# Patient Record
Sex: MALE | Employment: FULL TIME | URBAN - METROPOLITAN AREA
[De-identification: names, ages, dates, MRNs, and addresses within clinical notes are randomized per-mention and may not be internally consistent; named-entity substitution may affect disease eponyms.]

---

## 2017-01-31 ENCOUNTER — HOSPITAL ENCOUNTER (OUTPATIENT)
Dept: ULTRASOUND IMAGING | Age: 37
Discharge: HOME | End: 2017-01-31
Attending: INTERNAL MEDICINE
Payer: COMMERCIAL

## 2017-01-31 ENCOUNTER — HOSPITAL ENCOUNTER (OUTPATIENT)
Dept: NUCLEAR MEDICINE | Age: 37
Discharge: HOME | End: 2017-01-31
Attending: INTERNAL MEDICINE
Payer: COMMERCIAL

## 2017-01-31 VITALS — WEIGHT: 192 LBS

## 2017-01-31 DIAGNOSIS — R10.13 EPIGASTRIC ABDOMINAL PAIN OF UNKNOWN ETIOLOGY: ICD-10-CM

## 2017-01-31 PROCEDURE — 78227 HEPATOBIL SYST IMAGE W/DRUG: CPT

## 2017-01-31 PROCEDURE — 76700 US EXAM ABDOM COMPLETE: CPT

## 2017-01-31 PROCEDURE — 74011250636 HC RX REV CODE- 250/636

## 2017-01-31 RX ORDER — MORPHINE SULFATE 2 MG/ML
INJECTION, SOLUTION INTRAMUSCULAR; INTRAVENOUS
Status: COMPLETED
Start: 2017-01-31 | End: 2017-01-31

## 2017-01-31 RX ORDER — MORPHINE SULFATE 2 MG/ML
2 INJECTION, SOLUTION INTRAMUSCULAR; INTRAVENOUS ONCE
Status: COMPLETED | OUTPATIENT
Start: 2017-01-31 | End: 2017-01-31

## 2017-01-31 RX ADMIN — MORPHINE SULFATE 2 MG: 2 INJECTION, SOLUTION INTRAMUSCULAR; INTRAVENOUS at 14:17

## 2017-01-31 RX ADMIN — Medication 2 MG: at 14:17

## 2017-02-08 ENCOUNTER — OFFICE VISIT (OUTPATIENT)
Dept: SURGERY | Age: 37
End: 2017-02-08

## 2017-02-08 VITALS
OXYGEN SATURATION: 99 % | DIASTOLIC BLOOD PRESSURE: 82 MMHG | RESPIRATION RATE: 16 BRPM | HEART RATE: 83 BPM | SYSTOLIC BLOOD PRESSURE: 128 MMHG | WEIGHT: 191 LBS

## 2017-02-08 DIAGNOSIS — K80.20 GALLSTONES: Primary | ICD-10-CM

## 2017-02-08 NOTE — PROGRESS NOTES
The patient is a 39 y.o. man referred by Dr. Pushpa Calles regarding gallstones. The patient reports that he had a history over several months of pain in the epigastrium which became at times severe after eating fatty food. Sometimes it would radiate to the back. It was associated with a feeling of intense bloating as though vomiting would help. He did not have any actual vomiting. He has discovered over the last month that if he avoids fatty food he does not have active symptoms. He has tried acid blocking medications to try to control his symptoms. He has no other GI history. He has no history of abdominal surgery. The patient does actively smoke. The patient occasionally uses alcohol. The patient denies a history of heart disease or coronary intervention. He has no history of shortness of breath. There is no history of diabetes. There is no history of thyroid disease. Physical Examination:   GENERAL:  Alert man in no acute distress. VITAL SIGNS:  BP:  120/82. P:  83.  O2 saturation 99% on room air. WT:  191 lb. HEAD/NECK:  No jaundice, mass or thyromegaly. LUNGS:  Clear bilaterally without wheeze, rhonchi or rales. Ayan Broach HEART:  Regular without murmur, gallop or rub. ABDOMEN: Soft, nontender with no mass being palpated. No hernias are noted. NEURO:  Patient is alert and oriented x 3 and moves all extremities equally. Facial movement is symmetrical. Speech was normal.       The patient had an ultrasound which showed contracted gallbladder with multiple stones. The patient had a HIDA scan which showed nonvisualization of the gallbladder. There is no record of liver function tests. The patient has symptoms that do sound consistent with those of biliary origin. I would recommend planning for  laparoscopic cholecystectomy with possible open cholecystectomy and possible cholangiogram.  Typical operative and post operative courses were reviewed.   I reviewed with the patient the risks vs benefits of  laparoscopic cholecystectomy. Risks would include bleeding, infection, injury to abdominal organs, injury to common bile duct, bile leak, failure to resolve symptoms, potential need for open surgery, risk of general anesthesia, etc.     The patient understands and says he would like to have the surgery done probably several months from now as he is not presently having active symptoms while he manages his diet. Certainly the cholecystectomy is not an emergent issue, but I does suggest that it be done relatively soon to avoid potential complications of gallstones. The patient will call to let us know when he wants to proceed with the surgery. Final Diagnosis:  Gallstones. MedDATA/joselitoo     cc: PETE Lopez MD

## 2017-03-10 ENCOUNTER — TELEPHONE (OUTPATIENT)
Dept: SURGERY | Age: 37
End: 2017-03-10

## 2017-03-10 NOTE — TELEPHONE ENCOUNTER
Left v/m for Mr Luis Sánchez to call office. Need to confirm or reschedule date of surgery for lap alannah.

## 2017-03-14 NOTE — TELEPHONE ENCOUNTER
Senia Ceo called the office on 3/10/17 to confirm surgery scheduled for Friday, 4/7/17. Left v/m today for pt to confirm pre-admission testing date of 3/31/17.  Senia Ceo returned call confirming PAT appt. Mailed instructions to pt.

## 2017-03-30 NOTE — PERIOP NOTES
vm for Humberto Manuel; pt is 39 & does not require PAT visit. Does Dr. Maryann Luke still want pt to come in?  3/30/17@ 1958; per Humberto Manuel, Dr. Maryann Luke wants pt to come in.

## 2017-03-31 ENCOUNTER — HOSPITAL ENCOUNTER (OUTPATIENT)
Dept: PREADMISSION TESTING | Age: 37
Discharge: HOME | End: 2017-03-31
Attending: SURGERY
Payer: COMMERCIAL

## 2017-03-31 VITALS
BODY MASS INDEX: 24.78 KG/M2 | TEMPERATURE: 98.2 F | OXYGEN SATURATION: 98 % | HEART RATE: 93 BPM | SYSTOLIC BLOOD PRESSURE: 139 MMHG | RESPIRATION RATE: 18 BRPM | WEIGHT: 182.98 LBS | HEIGHT: 72 IN | DIASTOLIC BLOOD PRESSURE: 70 MMHG

## 2017-03-31 LAB
ALBUMIN SERPL BCP-MCNC: 3.8 G/DL (ref 3.5–5)
ALBUMIN/GLOB SERPL: 1 {RATIO} (ref 1.1–2.2)
ALP SERPL-CCNC: 98 U/L (ref 45–117)
ALT SERPL-CCNC: 46 U/L (ref 12–78)
ANION GAP BLD CALC-SCNC: 11 MMOL/L (ref 5–15)
ANION GAP BLD CALC-SCNC: 9 MMOL/L (ref 5–15)
AST SERPL W P-5'-P-CCNC: 18 U/L (ref 15–37)
BILIRUB SERPL-MCNC: 0.5 MG/DL (ref 0.2–1)
BUN SERPL-MCNC: 13 MG/DL (ref 6–20)
BUN SERPL-MCNC: 14 MG/DL (ref 6–20)
BUN/CREAT SERPL: 13 (ref 12–20)
BUN/CREAT SERPL: 14 (ref 12–20)
CALCIUM SERPL-MCNC: 9.1 MG/DL (ref 8.5–10.1)
CALCIUM SERPL-MCNC: 9.1 MG/DL (ref 8.5–10.1)
CHLORIDE SERPL-SCNC: 104 MMOL/L (ref 97–108)
CHLORIDE SERPL-SCNC: 104 MMOL/L (ref 97–108)
CO2 SERPL-SCNC: 26 MMOL/L (ref 21–32)
CO2 SERPL-SCNC: 27 MMOL/L (ref 21–32)
CREAT SERPL-MCNC: 1 MG/DL (ref 0.7–1.3)
CREAT SERPL-MCNC: 1.02 MG/DL (ref 0.7–1.3)
ERYTHROCYTE [DISTWIDTH] IN BLOOD BY AUTOMATED COUNT: 12.6 % (ref 11.5–14.5)
GLOBULIN SER CALC-MCNC: 3.8 G/DL (ref 2–4)
GLUCOSE SERPL-MCNC: 78 MG/DL (ref 65–100)
GLUCOSE SERPL-MCNC: 79 MG/DL (ref 65–100)
HCT VFR BLD AUTO: 48.2 % (ref 36.6–50.3)
HGB BLD-MCNC: 17 G/DL (ref 12.1–17)
MCH RBC QN AUTO: 31.5 PG (ref 26–34)
MCHC RBC AUTO-ENTMCNC: 35.3 G/DL (ref 30–36.5)
MCV RBC AUTO: 89.4 FL (ref 80–99)
PLATELET # BLD AUTO: 330 K/UL (ref 150–400)
POTASSIUM SERPL-SCNC: 3.9 MMOL/L (ref 3.5–5.1)
POTASSIUM SERPL-SCNC: 3.9 MMOL/L (ref 3.5–5.1)
PROT SERPL-MCNC: 7.6 G/DL (ref 6.4–8.2)
RBC # BLD AUTO: 5.39 M/UL (ref 4.1–5.7)
SODIUM SERPL-SCNC: 140 MMOL/L (ref 136–145)
SODIUM SERPL-SCNC: 141 MMOL/L (ref 136–145)
WBC # BLD AUTO: 8.6 K/UL (ref 4.1–11.1)

## 2017-03-31 PROCEDURE — 80053 COMPREHEN METABOLIC PANEL: CPT | Performed by: SURGERY

## 2017-03-31 PROCEDURE — 85027 COMPLETE CBC AUTOMATED: CPT | Performed by: SURGERY

## 2017-03-31 PROCEDURE — 80048 BASIC METABOLIC PNL TOTAL CA: CPT | Performed by: SURGERY

## 2017-03-31 PROCEDURE — 36415 COLL VENOUS BLD VENIPUNCTURE: CPT | Performed by: SURGERY

## 2017-03-31 RX ORDER — MELATONIN 5 MG
5 CAPSULE ORAL
COMMUNITY

## 2017-03-31 RX ORDER — SODIUM CHLORIDE, SODIUM LACTATE, POTASSIUM CHLORIDE, CALCIUM CHLORIDE 600; 310; 30; 20 MG/100ML; MG/100ML; MG/100ML; MG/100ML
25 INJECTION, SOLUTION INTRAVENOUS CONTINUOUS
Status: CANCELLED | OUTPATIENT
Start: 2017-04-07

## 2017-03-31 RX ORDER — BISMUTH SUBSALICYLATE 262 MG
1 TABLET,CHEWABLE ORAL DAILY
COMMUNITY

## 2017-03-31 RX ORDER — SIMETHICONE 125 MG
125 TABLET,CHEWABLE ORAL
COMMUNITY

## 2017-03-31 NOTE — PERIOP NOTES
[facility]  Preoperative Instructions        Surgery Date 04/07/2017          Time of Arrival 0545 am Contact #[phone]    1. On the day of your surgery, please report to the Surgical Services Registration Desk and sign in at your designated time. The Surgery Center is located to the right of the Emergency Room. 2. You must have someone with you to drive you home. You should not drive a car for 24 hours following surgery. Please make arrangements for a friend or family member to stay with you for the first 24 hours after your surgery. 3. Do not have anything to eat or drink (including water, gum, mints, coffee, juice) after midnight 04/06/2017. This may not apply to medications prescribed by your physician. Please note special instructions, if applicable. If you are currently taking Plavix, Coumadin, or other blood-thinning agents, contact your surgeon for instructions. 4. We recommend you do not drink any alcoholic beverages for 24 hours before and after your surgery. 5. Have a list of all current medications, including vitamins, herbal supplements and any other over the counter medications. Stop all Aspirin and non-steroidal anti-inflammatory drugs (I.e. Advil, Aleve), as directed by your surgeon's office. Stop all vitamins and herbal supplements seven days prior to your surgery. 6. Wear comfortable clothes. Wear glasses instead of contacts. Do not bring any money or jewelry. Please bring picture ID, insurance card, and any prearranged co-payment or hospital payment. Do not wear make-up, particularly mascara the morning of your surgery. Do not wear nail polish, particularly if you are having foot /hand surgery. Wear your hair loose or down, no ponytails, buns, ginette pins or clips. All body piercings must be removed.   Please shower with antibacterial soap for three consecutive days before and on the morning of surgery, but do not apply any lotions, powders or deodorants after the shower on the day of surgery. Please use a fresh towels after each shower. Please sleep in clean clothes and change bed linens the night before surgery. Please do not shave for 48 hours prior to surgery. Shaving of the face is acceptable. 7. You should understand that if you do not follow these instructions your surgery may be cancelled. If your physical condition changes (I.e. fever, cold or flu) please contact your surgeon as soon as possible. 8. It is important that you be on time. If a situation occurs where you may be late, please call [phone] (OR Holding Area). 9. If you have any questions and or problems, please call [phone] (Pre-admission Testing). 10. Your surgery time may be subject to change. You will receive a phone call the evening prior if your time changes. 11.  If having outpatient surgery, you must have someone to drive you here, stay with you during the duration of your stay, and to drive you home at time of discharge. Special Instructions:    MEDICATIONS TO TAKE THE MORNING OF SURGERY WITH A SIP OF WATER:none      I understand a pre-operative phone call will be made to verify my surgery time. In the event that I am not available, I give permission for a message to be left on my answering service and/or with another person?   yes         ___________________      __________   _________    (Signature of Patient)             (Witness)                (Date and Time)

## 2017-04-07 ENCOUNTER — ANESTHESIA EVENT (OUTPATIENT)
Dept: SURGERY | Age: 37
End: 2017-04-07
Payer: COMMERCIAL

## 2017-04-07 ENCOUNTER — HOSPITAL ENCOUNTER (OUTPATIENT)
Age: 37
Setting detail: OBSERVATION
Discharge: HOME | End: 2017-04-08
Attending: SURGERY | Admitting: SURGERY
Payer: COMMERCIAL

## 2017-04-07 ENCOUNTER — SURGERY (OUTPATIENT)
Age: 37
End: 2017-04-07

## 2017-04-07 ENCOUNTER — APPOINTMENT (OUTPATIENT)
Dept: GENERAL RADIOLOGY | Age: 37
End: 2017-04-07
Attending: SURGERY
Payer: COMMERCIAL

## 2017-04-07 ENCOUNTER — ANESTHESIA (OUTPATIENT)
Dept: SURGERY | Age: 37
End: 2017-04-07
Payer: COMMERCIAL

## 2017-04-07 PROCEDURE — 77030035045 HC TRCR ENDOSC VRSPRT BLDLSS COVD -B: Performed by: SURGERY

## 2017-04-07 PROCEDURE — 77030011241 HC DRN WND FLL CARD -B: Performed by: SURGERY

## 2017-04-07 PROCEDURE — 74011000258 HC RX REV CODE- 258: Performed by: SURGERY

## 2017-04-07 PROCEDURE — 74011250636 HC RX REV CODE- 250/636: Performed by: ANESTHESIOLOGY

## 2017-04-07 PROCEDURE — 77030008684 HC TU ET CUF COVD -B: Performed by: NURSE ANESTHETIST, CERTIFIED REGISTERED

## 2017-04-07 PROCEDURE — 77030019908 HC STETH ESOPH SIMS -A: Performed by: NURSE ANESTHETIST, CERTIFIED REGISTERED

## 2017-04-07 PROCEDURE — 77030009932 HC PRB FT CTRL J&J -B: Performed by: SURGERY

## 2017-04-07 PROCEDURE — 88304 TISSUE EXAM BY PATHOLOGIST: CPT | Performed by: SURGERY

## 2017-04-07 PROCEDURE — 74011250636 HC RX REV CODE- 250/636

## 2017-04-07 PROCEDURE — 74011250637 HC RX REV CODE- 250/637: Performed by: SURGERY

## 2017-04-07 PROCEDURE — 77030026438 HC STYL ET INTUB CARD -A: Performed by: NURSE ANESTHETIST, CERTIFIED REGISTERED

## 2017-04-07 PROCEDURE — 77030035048 HC TRCR ENDOSC OPTCL COVD -B: Performed by: SURGERY

## 2017-04-07 PROCEDURE — 77030011280 HC ELECTRD MPLR J&J -B: Performed by: SURGERY

## 2017-04-07 PROCEDURE — 77030008566 HC TBNG SUC IRR J&J -B: Performed by: SURGERY

## 2017-04-07 PROCEDURE — 77030035051: Performed by: SURGERY

## 2017-04-07 PROCEDURE — 77030008771 HC TU NG SALEM SUMP -A: Performed by: NURSE ANESTHETIST, CERTIFIED REGISTERED

## 2017-04-07 PROCEDURE — 77030018836 HC SOL IRR NACL ICUM -A: Performed by: SURGERY

## 2017-04-07 PROCEDURE — 77030002916 HC SUT ETHLN J&J -A: Performed by: SURGERY

## 2017-04-07 PROCEDURE — 76210000016 HC OR PH I REC 1 TO 1.5 HR: Performed by: SURGERY

## 2017-04-07 PROCEDURE — 74011250636 HC RX REV CODE- 250/636: Performed by: SURGERY

## 2017-04-07 PROCEDURE — 99218 HC RM OBSERVATION: CPT

## 2017-04-07 PROCEDURE — 77030011640 HC PAD GRND REM COVD -A: Performed by: SURGERY

## 2017-04-07 PROCEDURE — 77030032490 HC SLV COMPR SCD KNE COVD -B: Performed by: SURGERY

## 2017-04-07 PROCEDURE — 77030010507 HC ADH SKN DERMBND J&J -B: Performed by: SURGERY

## 2017-04-07 PROCEDURE — 77030012022 HC APPL CLP ENDOSC COVD -C: Performed by: SURGERY

## 2017-04-07 PROCEDURE — 74011000250 HC RX REV CODE- 250

## 2017-04-07 PROCEDURE — 76060000035 HC ANESTHESIA 2 TO 2.5 HR: Performed by: SURGERY

## 2017-04-07 PROCEDURE — 76010000131 HC OR TIME 2 TO 2.5 HR: Performed by: SURGERY

## 2017-04-07 PROCEDURE — 77030020782 HC GWN BAIR PAWS FLX 3M -B

## 2017-04-07 PROCEDURE — 77030020263 HC SOL INJ SOD CL0.9% LFCR 1000ML: Performed by: SURGERY

## 2017-04-07 PROCEDURE — 74011000250 HC RX REV CODE- 250: Performed by: SURGERY

## 2017-04-07 PROCEDURE — 77030009852 HC PCH RTVR ENDOSC COVD -B: Performed by: SURGERY

## 2017-04-07 PROCEDURE — 77030031139 HC SUT VCRL2 J&J -A: Performed by: SURGERY

## 2017-04-07 RX ORDER — BUPIVACAINE HYDROCHLORIDE AND EPINEPHRINE 2.5; 5 MG/ML; UG/ML
INJECTION, SOLUTION EPIDURAL; INFILTRATION; INTRACAUDAL; PERINEURAL AS NEEDED
Status: DISCONTINUED | OUTPATIENT
Start: 2017-04-07 | End: 2017-04-07 | Stop reason: HOSPADM

## 2017-04-07 RX ORDER — FENTANYL CITRATE 50 UG/ML
25 INJECTION, SOLUTION INTRAMUSCULAR; INTRAVENOUS
Status: DISCONTINUED | OUTPATIENT
Start: 2017-04-07 | End: 2017-04-07 | Stop reason: HOSPADM

## 2017-04-07 RX ORDER — ONDANSETRON 2 MG/ML
4 INJECTION INTRAMUSCULAR; INTRAVENOUS AS NEEDED
Status: DISCONTINUED | OUTPATIENT
Start: 2017-04-07 | End: 2017-04-07 | Stop reason: HOSPADM

## 2017-04-07 RX ORDER — CEFAZOLIN SODIUM IN 0.9 % NACL 2 G/100 ML
2 PLASTIC BAG, INJECTION (ML) INTRAVENOUS
Status: DISCONTINUED | OUTPATIENT
Start: 2017-04-07 | End: 2017-04-07

## 2017-04-07 RX ORDER — FENTANYL CITRATE 50 UG/ML
INJECTION, SOLUTION INTRAMUSCULAR; INTRAVENOUS AS NEEDED
Status: DISCONTINUED | OUTPATIENT
Start: 2017-04-07 | End: 2017-04-07 | Stop reason: HOSPADM

## 2017-04-07 RX ORDER — FENTANYL CITRATE 50 UG/ML
50 INJECTION, SOLUTION INTRAMUSCULAR; INTRAVENOUS AS NEEDED
Status: DISCONTINUED | OUTPATIENT
Start: 2017-04-07 | End: 2017-04-07 | Stop reason: HOSPADM

## 2017-04-07 RX ORDER — DIPHENHYDRAMINE HYDROCHLORIDE 50 MG/ML
12.5 INJECTION, SOLUTION INTRAMUSCULAR; INTRAVENOUS AS NEEDED
Status: DISCONTINUED | OUTPATIENT
Start: 2017-04-07 | End: 2017-04-07 | Stop reason: HOSPADM

## 2017-04-07 RX ORDER — SODIUM CHLORIDE, SODIUM LACTATE, POTASSIUM CHLORIDE, CALCIUM CHLORIDE 600; 310; 30; 20 MG/100ML; MG/100ML; MG/100ML; MG/100ML
25 INJECTION, SOLUTION INTRAVENOUS CONTINUOUS
Status: DISCONTINUED | OUTPATIENT
Start: 2017-04-07 | End: 2017-04-07 | Stop reason: HOSPADM

## 2017-04-07 RX ORDER — LANOLIN ALCOHOL/MO/W.PET/CERES
5 CREAM (GRAM) TOPICAL
Status: DISCONTINUED | OUTPATIENT
Start: 2017-04-07 | End: 2017-04-08 | Stop reason: HOSPADM

## 2017-04-07 RX ORDER — DEXAMETHASONE SODIUM PHOSPHATE 4 MG/ML
INJECTION, SOLUTION INTRA-ARTICULAR; INTRALESIONAL; INTRAMUSCULAR; INTRAVENOUS; SOFT TISSUE AS NEEDED
Status: DISCONTINUED | OUTPATIENT
Start: 2017-04-07 | End: 2017-04-07 | Stop reason: HOSPADM

## 2017-04-07 RX ORDER — SODIUM CHLORIDE 9 MG/ML
50 INJECTION, SOLUTION INTRAVENOUS CONTINUOUS
Status: DISPENSED | OUTPATIENT
Start: 2017-04-07 | End: 2017-04-08

## 2017-04-07 RX ORDER — SIMETHICONE 80 MG
80 TABLET,CHEWABLE ORAL
Status: DISCONTINUED | OUTPATIENT
Start: 2017-04-07 | End: 2017-04-08 | Stop reason: HOSPADM

## 2017-04-07 RX ORDER — NEOSTIGMINE METHYLSULFATE 1 MG/ML
INJECTION INTRAVENOUS AS NEEDED
Status: DISCONTINUED | OUTPATIENT
Start: 2017-04-07 | End: 2017-04-07 | Stop reason: HOSPADM

## 2017-04-07 RX ORDER — SODIUM CHLORIDE 0.9 % (FLUSH) 0.9 %
5-10 SYRINGE (ML) INJECTION EVERY 8 HOURS
Status: DISCONTINUED | OUTPATIENT
Start: 2017-04-07 | End: 2017-04-08 | Stop reason: HOSPADM

## 2017-04-07 RX ORDER — BUPIVACAINE HYDROCHLORIDE AND EPINEPHRINE 2.5; 5 MG/ML; UG/ML
30 INJECTION, SOLUTION EPIDURAL; INFILTRATION; INTRACAUDAL; PERINEURAL ONCE
Status: DISCONTINUED | OUTPATIENT
Start: 2017-04-07 | End: 2017-04-07 | Stop reason: HOSPADM

## 2017-04-07 RX ORDER — DIPHENHYDRAMINE HYDROCHLORIDE 50 MG/ML
12.5 INJECTION, SOLUTION INTRAMUSCULAR; INTRAVENOUS
Status: DISCONTINUED | OUTPATIENT
Start: 2017-04-07 | End: 2017-04-08 | Stop reason: HOSPADM

## 2017-04-07 RX ORDER — LIDOCAINE HYDROCHLORIDE 20 MG/ML
INJECTION, SOLUTION EPIDURAL; INFILTRATION; INTRACAUDAL; PERINEURAL AS NEEDED
Status: DISCONTINUED | OUTPATIENT
Start: 2017-04-07 | End: 2017-04-07 | Stop reason: HOSPADM

## 2017-04-07 RX ORDER — GLYCOPYRROLATE 0.2 MG/ML
INJECTION INTRAMUSCULAR; INTRAVENOUS AS NEEDED
Status: DISCONTINUED | OUTPATIENT
Start: 2017-04-07 | End: 2017-04-07 | Stop reason: HOSPADM

## 2017-04-07 RX ORDER — HYDROMORPHONE HYDROCHLORIDE 1 MG/ML
0.5 INJECTION, SOLUTION INTRAMUSCULAR; INTRAVENOUS; SUBCUTANEOUS
Status: DISCONTINUED | OUTPATIENT
Start: 2017-04-07 | End: 2017-04-08 | Stop reason: HOSPADM

## 2017-04-07 RX ORDER — KETOROLAC TROMETHAMINE 30 MG/ML
INJECTION, SOLUTION INTRAMUSCULAR; INTRAVENOUS AS NEEDED
Status: DISCONTINUED | OUTPATIENT
Start: 2017-04-07 | End: 2017-04-07 | Stop reason: HOSPADM

## 2017-04-07 RX ORDER — OXYCODONE AND ACETAMINOPHEN 5; 325 MG/1; MG/1
1-2 TABLET ORAL
Qty: 30 TAB | Refills: 0 | Status: SHIPPED | OUTPATIENT
Start: 2017-04-07

## 2017-04-07 RX ORDER — HYDROMORPHONE HYDROCHLORIDE 1 MG/ML
.2-.5 INJECTION, SOLUTION INTRAMUSCULAR; INTRAVENOUS; SUBCUTANEOUS
Status: DISCONTINUED | OUTPATIENT
Start: 2017-04-07 | End: 2017-04-07 | Stop reason: HOSPADM

## 2017-04-07 RX ORDER — SUCCINYLCHOLINE CHLORIDE 20 MG/ML
INJECTION INTRAMUSCULAR; INTRAVENOUS AS NEEDED
Status: DISCONTINUED | OUTPATIENT
Start: 2017-04-07 | End: 2017-04-07 | Stop reason: HOSPADM

## 2017-04-07 RX ORDER — OXYCODONE AND ACETAMINOPHEN 5; 325 MG/1; MG/1
1-2 TABLET ORAL
Status: DISCONTINUED | OUTPATIENT
Start: 2017-04-07 | End: 2017-04-08 | Stop reason: HOSPADM

## 2017-04-07 RX ORDER — MIDAZOLAM HYDROCHLORIDE 1 MG/ML
INJECTION, SOLUTION INTRAMUSCULAR; INTRAVENOUS AS NEEDED
Status: DISCONTINUED | OUTPATIENT
Start: 2017-04-07 | End: 2017-04-07 | Stop reason: HOSPADM

## 2017-04-07 RX ORDER — MIDAZOLAM HYDROCHLORIDE 1 MG/ML
1 INJECTION, SOLUTION INTRAMUSCULAR; INTRAVENOUS AS NEEDED
Status: DISCONTINUED | OUTPATIENT
Start: 2017-04-07 | End: 2017-04-07 | Stop reason: HOSPADM

## 2017-04-07 RX ORDER — ONDANSETRON 2 MG/ML
4 INJECTION INTRAMUSCULAR; INTRAVENOUS
Status: DISCONTINUED | OUTPATIENT
Start: 2017-04-07 | End: 2017-04-08 | Stop reason: HOSPADM

## 2017-04-07 RX ORDER — HYDROMORPHONE HYDROCHLORIDE 2 MG/ML
INJECTION, SOLUTION INTRAMUSCULAR; INTRAVENOUS; SUBCUTANEOUS AS NEEDED
Status: DISCONTINUED | OUTPATIENT
Start: 2017-04-07 | End: 2017-04-07 | Stop reason: HOSPADM

## 2017-04-07 RX ORDER — SODIUM CHLORIDE 0.9 % (FLUSH) 0.9 %
5-10 SYRINGE (ML) INJECTION AS NEEDED
Status: DISCONTINUED | OUTPATIENT
Start: 2017-04-07 | End: 2017-04-08 | Stop reason: HOSPADM

## 2017-04-07 RX ORDER — PROPOFOL 10 MG/ML
INJECTION, EMULSION INTRAVENOUS AS NEEDED
Status: DISCONTINUED | OUTPATIENT
Start: 2017-04-07 | End: 2017-04-07 | Stop reason: HOSPADM

## 2017-04-07 RX ORDER — HEPARIN SODIUM 5000 [USP'U]/ML
5000 INJECTION, SOLUTION INTRAVENOUS; SUBCUTANEOUS EVERY 8 HOURS
Status: DISCONTINUED | OUTPATIENT
Start: 2017-04-07 | End: 2017-04-08 | Stop reason: HOSPADM

## 2017-04-07 RX ORDER — ONDANSETRON 2 MG/ML
INJECTION INTRAMUSCULAR; INTRAVENOUS AS NEEDED
Status: DISCONTINUED | OUTPATIENT
Start: 2017-04-07 | End: 2017-04-07 | Stop reason: HOSPADM

## 2017-04-07 RX ORDER — LIDOCAINE HYDROCHLORIDE 10 MG/ML
0.1 INJECTION, SOLUTION EPIDURAL; INFILTRATION; INTRACAUDAL; PERINEURAL AS NEEDED
Status: DISCONTINUED | OUTPATIENT
Start: 2017-04-07 | End: 2017-04-07 | Stop reason: HOSPADM

## 2017-04-07 RX ORDER — NALOXONE HYDROCHLORIDE 0.4 MG/ML
0.4 INJECTION, SOLUTION INTRAMUSCULAR; INTRAVENOUS; SUBCUTANEOUS
Status: DISCONTINUED | OUTPATIENT
Start: 2017-04-07 | End: 2017-04-08 | Stop reason: HOSPADM

## 2017-04-07 RX ORDER — ROCURONIUM BROMIDE 10 MG/ML
INJECTION, SOLUTION INTRAVENOUS AS NEEDED
Status: DISCONTINUED | OUTPATIENT
Start: 2017-04-07 | End: 2017-04-07 | Stop reason: HOSPADM

## 2017-04-07 RX ADMIN — BUPIVACAINE HYDROCHLORIDE AND EPINEPHRINE 8 ML: 2.5; 5 INJECTION, SOLUTION EPIDURAL; INFILTRATION; INTRACAUDAL; PERINEURAL at 09:25

## 2017-04-07 RX ADMIN — FENTANYL CITRATE 50 MCG: 50 INJECTION, SOLUTION INTRAMUSCULAR; INTRAVENOUS at 09:06

## 2017-04-07 RX ADMIN — FENTANYL CITRATE 100 MCG: 50 INJECTION, SOLUTION INTRAMUSCULAR; INTRAVENOUS at 07:35

## 2017-04-07 RX ADMIN — HYDROMORPHONE HYDROCHLORIDE 0.4 MG: 2 INJECTION, SOLUTION INTRAMUSCULAR; INTRAVENOUS; SUBCUTANEOUS at 08:11

## 2017-04-07 RX ADMIN — HYDROMORPHONE HYDROCHLORIDE 0.5 MG: 1 INJECTION, SOLUTION INTRAMUSCULAR; INTRAVENOUS; SUBCUTANEOUS at 23:16

## 2017-04-07 RX ADMIN — ROCURONIUM BROMIDE 30 MG: 10 INJECTION, SOLUTION INTRAVENOUS at 07:45

## 2017-04-07 RX ADMIN — ONDANSETRON 4 MG: 2 INJECTION INTRAMUSCULAR; INTRAVENOUS at 09:26

## 2017-04-07 RX ADMIN — NEOSTIGMINE METHYLSULFATE 3 MG: 1 INJECTION INTRAVENOUS at 09:32

## 2017-04-07 RX ADMIN — OXYCODONE HYDROCHLORIDE AND ACETAMINOPHEN 2 TABLET: 5; 325 TABLET ORAL at 18:16

## 2017-04-07 RX ADMIN — HYDROMORPHONE HYDROCHLORIDE 0.6 MG: 2 INJECTION, SOLUTION INTRAMUSCULAR; INTRAVENOUS; SUBCUTANEOUS at 07:53

## 2017-04-07 RX ADMIN — SODIUM CHLORIDE, SODIUM LACTATE, POTASSIUM CHLORIDE, AND CALCIUM CHLORIDE 25 ML/HR: 600; 310; 30; 20 INJECTION, SOLUTION INTRAVENOUS at 06:41

## 2017-04-07 RX ADMIN — CEFAZOLIN 2 G: 1 INJECTION, POWDER, FOR SOLUTION INTRAMUSCULAR; INTRAVENOUS; PARENTERAL at 07:44

## 2017-04-07 RX ADMIN — FENTANYL CITRATE 50 MCG: 50 INJECTION, SOLUTION INTRAMUSCULAR; INTRAVENOUS at 08:17

## 2017-04-07 RX ADMIN — SODIUM CHLORIDE 50 ML/HR: 900 INJECTION, SOLUTION INTRAVENOUS at 10:01

## 2017-04-07 RX ADMIN — GLYCOPYRROLATE 0.6 MG: 0.2 INJECTION INTRAMUSCULAR; INTRAVENOUS at 09:32

## 2017-04-07 RX ADMIN — ROCURONIUM BROMIDE 10 MG: 10 INJECTION, SOLUTION INTRAVENOUS at 08:52

## 2017-04-07 RX ADMIN — SODIUM CHLORIDE, SODIUM LACTATE, POTASSIUM CHLORIDE, AND CALCIUM CHLORIDE: 600; 310; 30; 20 INJECTION, SOLUTION INTRAVENOUS at 09:08

## 2017-04-07 RX ADMIN — HYDROMORPHONE HYDROCHLORIDE 0.5 MG: 1 INJECTION, SOLUTION INTRAMUSCULAR; INTRAVENOUS; SUBCUTANEOUS at 21:19

## 2017-04-07 RX ADMIN — OXYCODONE HYDROCHLORIDE AND ACETAMINOPHEN 2 TABLET: 5; 325 TABLET ORAL at 12:43

## 2017-04-07 RX ADMIN — GLYCOPYRROLATE 0.1 MG: 0.2 INJECTION INTRAMUSCULAR; INTRAVENOUS at 08:08

## 2017-04-07 RX ADMIN — PROPOFOL 200 MG: 10 INJECTION, EMULSION INTRAVENOUS at 07:35

## 2017-04-07 RX ADMIN — MIDAZOLAM HYDROCHLORIDE 2 MG: 1 INJECTION, SOLUTION INTRAMUSCULAR; INTRAVENOUS at 07:28

## 2017-04-07 RX ADMIN — LIDOCAINE HYDROCHLORIDE 80 MG: 20 INJECTION, SOLUTION EPIDURAL; INFILTRATION; INTRACAUDAL; PERINEURAL at 07:35

## 2017-04-07 RX ADMIN — HYDROMORPHONE HYDROCHLORIDE 0.5 MG: 2 INJECTION, SOLUTION INTRAMUSCULAR; INTRAVENOUS; SUBCUTANEOUS at 09:29

## 2017-04-07 RX ADMIN — ROCURONIUM BROMIDE 10 MG: 10 INJECTION, SOLUTION INTRAVENOUS at 07:35

## 2017-04-07 RX ADMIN — DEXAMETHASONE SODIUM PHOSPHATE 8 MG: 4 INJECTION, SOLUTION INTRA-ARTICULAR; INTRALESIONAL; INTRAMUSCULAR; INTRAVENOUS; SOFT TISSUE at 07:45

## 2017-04-07 RX ADMIN — Medication 10 ML: at 23:17

## 2017-04-07 RX ADMIN — KETOROLAC TROMETHAMINE 30 MG: 30 INJECTION, SOLUTION INTRAMUSCULAR; INTRAVENOUS at 09:26

## 2017-04-07 RX ADMIN — SUCCINYLCHOLINE CHLORIDE 160 MG: 20 INJECTION INTRAMUSCULAR; INTRAVENOUS at 07:35

## 2017-04-07 RX ADMIN — HEPARIN SODIUM 5000 UNITS: 5000 INJECTION, SOLUTION INTRAVENOUS; SUBCUTANEOUS at 23:00

## 2017-04-07 NOTE — PERIOP NOTES
TRANSFER - OUT REPORT:    Verbal report given to KATHY Simms (name) on Lv Beck  being transferred to 2112 (unit) for routine post - op       Report consisted of patients Situation, Background, Assessment and   Recommendations(SBAR). Information from the following report(s) SBAR, OR Summary, Intake/Output, MAR, Recent Results and Med Rec Status was reviewed with the receiving nurse. Opportunity for questions and clarification was provided.       Patient transported with:   [employer]

## 2017-04-07 NOTE — PROGRESS NOTES
Pt is currently in observation status. Pt was alert and oriented, with family by bedside. Pt reported that he resides in a 2 story home with his spouse (5 steps into main entrance). Pt reported that that he is independent with ADLs/IADLs, and he drives. Pt reported that he goes to [facility], for emergency visits, and uses [facility]. Pt reported no DME, SNF/HH. Pt reported that that he will have transportation at the time of d/c. Care Management Interventions  PCP Verified by CM: Yes  Mode of Transport at Discharge:  Other (see comment)  Transition of Care Consult (CM Consult): Discharge Planning  Discharge Durable Medical Equipment: No  Physical Therapy Consult: No  Occupational Therapy Consult: No  Speech Therapy Consult: No  Current Support Network: Lives with Spouse, Own Home  Confirm Follow Up Transport: Self  Plan discussed with Pt/Family/Caregiver: Yes  Discharge Location  Discharge Placement: [address], MSW   [phone]

## 2017-04-07 NOTE — OP NOTES
Operative Report        Laparoscopic Cholecystectomy     CPT 64781    Patient:  Luis Batista    Date of Surgery:  4/7/2017    Preoperative diagnosis: gallstones    Postoperative Diagnosis: Same    Anesthesia:  General    Surgeon:  Dale Navarro     Procedure :  Laparoscopic Cholecystectomy     Operative Summary:  The patient was taken to the operating room and placed on the operating table in the supine position. General anesthesia was induced. The patient's abdomen was prepared and draped. An incision was made in the skin at the superior border of the umbilicus and carried down through the fascia and peritoneum. The peritoneal cavity was entered. A blunt tipped 12 mm trochar and sheath were introduced and the abdomen was inflated with CO2. The laparoscope was introduced. The abdominal contents deep to the entry point appeared normal.  The liver appeared normal.  The gallbladder was thickened and inflamed. Under visualization, a 5mm port was placed high in the midline epigastrium and two 5mm ports were placed in the right upper quadrant. The fundus of the gallbladder was grasped and lifted upward. Adhesions to the gallbladder were stripped away. The infundibulum of the gallbladder was grasped and lifted upward and to the right. The peritoneum at the lower end of the gallbladder was stripped away and the cystic duct was exposed for about 2.5 centimeters. Tissues were edematous. The cystic duct was clipped with one clip adjacent to the gallbladder. An opening was made in the cystic duct next to the clip. The cystic duct was obstructed 1 cm down from the opening and would not accommodate the cholangiogram catheter and therefore a cholangiogram was not performed. The cystic duct was observed without tension to avoid tenting of the common bile duct. The cystic duct was clipped with two clips on the patient's side and divided.   The cystic artery was dissected out  for about 2 centimeters and was seen going up onto the wall of the gallbladder. The cystic artery was doubly clipped on the patient's side and singly clipped on the gallbladder side and divided. Attachments of the infundibulum to the liver were stripped away. Small vessels were clipped and divided. The body of the gallbladder was then  from its bed on the liver with electrocautery and tedious dissection. There was severe inflammation of the gallbladder bed and dense adherence of the gallbladder to the gallbladder bed. The gallbladder bed oozed for a time but hemostasis was obtained. Once the gallbladder was freed, it was placed in a polyurethane sack and brought out through the umbilical port site. The umbilical port was then replaced and the abdomen reinflated. The operative site was inspected and there was good hemostasis. There was no evidence of bile leak. Under visualization a 7 mm Ollie-Schilling drain was placed in the bed of the mobilized gallbladder and brought out through one of the right upper quadrant port sites. The upper ports were removed under laparoscopic visualization and the abdomen deflated. The umbilical port was removed. The fascia at the umbilicus was closed with 0 Vicryl. The skin at all the sites was closed with interuppted inverted intracuticular 4-0 Vicryl. The drain was sewn in place with 3-0 nylon. Dermabond was then applied to the skin incisions. The patient tolerated the surgery well and was taken to the PACU in stable condition.     Specimen - Gallbladder     Drain - Ollie-Schilling    Estimate Blood Loss - 018 ml     Complications - none      HOMERO Bernal MD

## 2017-04-07 NOTE — IP AVS SNAPSHOT
Summary of Care Report The Summary of Care report has been created to help improve care coordination. Users with access to [employer] or [employer] (Web-based application) may access additional patient information including the Discharge Summary. If you are not currently a [address] user and need more information, please call the number listed below in the [place] [address] section and ask to be connected with Medical Records. Facility Information Name Address Phone [address] [address] [phone] Patient Information Patient Name Sex  Cindy Ricardo ([id number]) Male 1980 Discharge Information Admitting Provider Service Area Unit Torito Duvall MD / [phone] [address] [facility] 2 General Surgery / [phone] Discharge Provider Discharge Date/Time Discharge Disposition Destination (none) 2017 Morning (Pending) AHR (none) Patient Language Language ENGLISH [13] Hospital Problems as of 2017  Reviewed: 2017  9:06 AM by Torito Duvall MD  
 None Non-Hospital Problems as of 2017  Reviewed: 2017  9:06 AM by Torito Duvall MD  
  
  
  
 Class Noted - Resolved Last Modified Active Problems Gallstones  2017 - Present 2017 by Torito Duvall MD  
  Entered by Torito Duvall MD  
  
You are allergic to the following No active allergies Current Discharge Medication List  
  
START taking these medications Dose & Instructions Dispensing Information Comments  
 oxyCODONE-acetaminophen 5-325 mg per tablet Commonly known as:  PERCOCET Dose:  1-2 Tab Take 1-2 Tabs by mouth every four (4) hours as needed. Max Daily Amount: 12 Tabs. Quantity:  30 Tab Refills:  0 CONTINUE these medications which have NOT CHANGED Dose & Instructions Dispensing Information Comments GAS-X EXTRA STRENGTH 125 mg chewable tablet Generic drug:  simethicone Dose:  125 mg Take 125 mg by mouth every six (6) hours as needed for Flatulence. Refills:  0  
   
 melatonin 5 mg Cap capsule Dose:  5 mg Take 5 mg by mouth nightly. Refills:  0  
   
 multivitamin tablet Commonly known as:  ONE A DAY Dose:  1 Tab Take 1 Tab by mouth daily. Refills:  0 Surgery Information ID Date/Time Status Primary Surgeon All Procedures Location [id number] 4/7/2017 0730 Unposted Torito Duvall MD LAPAROSCOPIC CHOLECYSTECTOMY POSSIBLE OPEN CHOLECYSTECTOMY POSSIBLE CHOLANGIOGRAM [facility] MAIN OR Follow-up Information Follow up With Details Comments Contact Info Charo Parker, MD   Patient can only remember the practice name and not the physician Discharge Instructions Discharge Instructions After Removal of Gallbladder It is normal to tire easily for a while. No lifting over 15 pounds for one month. It is OK to walk up stairs or bend over as needed. Do not drive for 3 days and do not drive while taking narcotic pain medication. Remove any gauze dressings tomorrow. Let Dermabond (plastic coating on incisions) wear off on its own. It is OK to shower. Eat lightly today, then follow your normal diet. You may use stool softeners such as Colace or a laxative such as Miralax as needed for constipation. Take pain medicines as prescribed as needed. Ibuprofen (Advil) up to 800 mg by mouth every 6 hours as needed for pain may be helpful for pain control and can be taken together with narcotic pain medication. Reduce dose to 200 mg by mouth every 6 hours as needed for pain after 2 days. Call for follow up appointment in 2 weeks  - [phone] If you have any problems, call Dr Reddy Lincoln at [phone] or [phone] (after hours) ALANA Nazario MD 
 
 
 
 
 
 
 
Chart Review Routing History No Routing History on File

## 2017-04-07 NOTE — DISCHARGE INSTRUCTIONS
Discharge Instructions After Removal of Gallbladder          It is normal to tire easily for a while. No lifting over 15 pounds for one month. It is OK to walk up stairs or bend over as needed. Do not drive for 3 days and do not drive while taking narcotic pain medication. Remove any gauze dressings tomorrow. Let Dermabond (plastic coating on incisions) wear off on its own. It is OK to shower. Eat lightly today, then follow your normal diet. You may use stool softeners such as Colace or a laxative such as Miralax as needed for constipation. Take pain medicines as prescribed as needed. Ibuprofen (Advil) up to 800 mg by mouth every 6 hours as needed for pain may be helpful for pain control and can be taken together with narcotic pain medication. Reduce dose to 200 mg by mouth every 6 hours as needed for pain after 2 days. Call for follow up appointment in 2 weeks  - [phone]    If you have any problems, call Dr Duane Heir at [phone] or [phone] (after hours)        ALANA Ospina MD

## 2017-04-07 NOTE — IP AVS SNAPSHOT
[address] [facility] 
[phone] Patient: Merrill Palomino MRN: [id number] STV:1[birth year]7/[birth year]3 You are allergic to the following No active allergies Recent Documentation Height Weight BMI Smoking Status 1.829 m 82.2 kg 24.58 kg/m2 Current Every Day Smoker Emergency Contacts Name Discharge Info Relation Home Work Mobile Caroline Up DISCHARGE CAREGIVER [3] Spouse [3] [phone] JeovannyCaroline  Other Relative [6] [phone] About your hospitalization You were admitted on:  April 7, 2017 You last received care in the:  [facility] 2 GENERAL SURGERY You were discharged on:  April 8, 2017 Unit phone number:  [phone] Why you were hospitalized Your primary diagnosis was:  Not on File Providers Seen During Your Hospitalizations Provider Role Specialty Primary office phone Lyssa Vail MD Attending Provider General Surgery [phone] Your Primary Care Physician (PCP) Primary Care Physician Office Phone Office Fax OTHER, PHYS ** None ** ** None ** Follow-up Information Follow up With Details Comments Contact Info Charo Parker MD   Patient can only remember the practice name and not the physician Current Discharge Medication List  
  
START taking these medications Dose & Instructions Dispensing Information Comments Morning Noon Evening Bedtime  
 oxyCODONE-acetaminophen 5-325 mg per tablet Commonly known as:  PERCOCET Your last dose was: Your next dose is:    
   
   
 Dose:  1-2 Tab Take 1-2 Tabs by mouth every four (4) hours as needed. Max Daily Amount: 12 Tabs. Quantity:  30 Tab Refills:  0 CONTINUE these medications which have NOT CHANGED Dose & Instructions Dispensing Information Comments Morning Noon Evening Bedtime GAS-X EXTRA STRENGTH 125 mg chewable tablet Generic drug:  simethicone Your last dose was: Your next dose is:    
   
   
 Dose:  125 mg Take 125 mg by mouth every six (6) hours as needed for Flatulence. Refills:  0  
     
   
   
   
  
 melatonin 5 mg Cap capsule Your last dose was: Your next dose is:    
   
   
 Dose:  5 mg Take 5 mg by mouth nightly. Refills:  0  
     
   
   
   
  
 multivitamin tablet Commonly known as:  ONE A DAY Your last dose was: Your next dose is:    
   
   
 Dose:  1 Tab Take 1 Tab by mouth daily. Refills:  0 Where to Get Your Medications Information on where to get these meds will be given to you by the nurse or doctor. ! Ask your nurse or doctor about these medications  
  oxyCODONE-acetaminophen 5-325 mg per tablet Discharge Instructions Discharge Instructions After Removal of Gallbladder It is normal to tire easily for a while. No lifting over 15 pounds for one month. It is OK to walk up stairs or bend over as needed. Do not drive for 3 days and do not drive while taking narcotic pain medication. Remove any gauze dressings tomorrow. Let Dermabond (plastic coating on incisions) wear off on its own. It is OK to shower. Eat lightly today, then follow your normal diet. You may use stool softeners such as Colace or a laxative such as Miralax as needed for constipation. Take pain medicines as prescribed as needed. Ibuprofen (Advil) up to 800 mg by mouth every 6 hours as needed for pain may be helpful for pain control and can be taken together with narcotic pain medication. Reduce dose to 200 mg by mouth every 6 hours as needed for pain after 2 days. Call for follow up appointment in 2 weeks  - [phone] If you have any problems, call Dr Geetha Leggett at [phone] or [phone] (after hours) ALANA Cuevas MD 
 
 
 
 
 
 
 Discharge Orders None DocuSign Announcement We are excited to announce that we are making your provider's discharge notes available to you in DocuSign. You will see these notes when they are completed and signed by the physician that discharged you from your recent hospital stay. If you have any questions or concerns about any information you see in DocuSign, please call the Health Information Department where you were seen or reach out to your Primary Care Provider for more information about your plan of care. Introducing [facility]! [facility] introduces DocuSign patient portal. Now you can access parts of your medical record, email your doctor's office, and request medication refills online. 1. In your internet browser, go to [email or web address] 2. Click on the First Time User? Click Here link in the Sign In box. You will see the New Member Sign Up page. 3. Enter your DocuSign Access Code exactly as it appears below. You will not need to use this code after youve completed the sign-up process. If you do not sign up before the expiration date, you must request a new code. · DocuSign Access Code: [email or web address] Expires: 4/20/2017  2:58 PM 
 
4. Enter the last four digits of your Social Security Number (xxxx) and Date of Birth (mm/dd/yyyy) as indicated and click Submit. You will be taken to the next sign-up page. 5. Create a DocuSign ID. This will be your DocuSign login ID and cannot be changed, so think of one that is secure and easy to remember. 6. Create a DocuSign password. You can change your password at any time. 7. Enter your Password Reset Question and Answer. This can be used at a later time if you forget your password. 8. Enter your e-mail address. You will receive e-mail notification when new information is available in [address] 9. Click Sign Up. You can now view and download portions of your medical record. 10. Click the Download Summary menu link to download a portable copy of your medical information. If you have questions, please visit the Frequently Asked Questions section of the [employer] website. Remember, MyChart is NOT to be used for urgent needs. For medical emergencies, dial 911. Now available from your iPhone and Android! General Information Please provide this summary of care documentation to your next provider. Patient Signature:  ____________________________________________________________ Date:  ____________________________________________________________  
  
Jamir Bough Provider Signature:  ____________________________________________________________ Date:  ____________________________________________________________

## 2017-04-07 NOTE — ANESTHESIA PREPROCEDURE EVALUATION
Anesthetic History   No history of anesthetic complications            Review of Systems / Medical History  Patient summary reviewed, nursing notes reviewed and pertinent labs reviewed    Pulmonary  Within defined limits                 Neuro/Psych   Within defined limits           Cardiovascular  Within defined limits                Exercise tolerance: >4 METS     GI/Hepatic/Renal  Within defined limits              Endo/Other  Within defined limits           Other Findings   Comments: Gallstones           Physical Exam    Airway  Mallampati: II  TM Distance: > 6 cm  Neck ROM: normal range of motion   Mouth opening: Normal     Cardiovascular  Regular rate and rhythm,  S1 and S2 normal,  no murmur, click, rub, or gallop             Dental  No notable dental hx       Pulmonary  Breath sounds clear to auscultation               Abdominal  GI exam deferred       Other Findings            Anesthetic Plan    ASA: 1  Anesthesia type: general          Induction: Intravenous  Anesthetic plan and risks discussed with: Patient

## 2017-04-07 NOTE — IP AVS SNAPSHOT
Current Discharge Medication List  
  
START taking these medications Dose & Instructions Dispensing Information Comments Morning Noon Evening Bedtime  
 oxyCODONE-acetaminophen 5-325 mg per tablet Commonly known as:  PERCOCET Your last dose was: Your next dose is:    
   
   
 Dose:  1-2 Tab Take 1-2 Tabs by mouth every four (4) hours as needed. Max Daily Amount: 12 Tabs. Quantity:  30 Tab Refills:  0 CONTINUE these medications which have NOT CHANGED Dose & Instructions Dispensing Information Comments Morning Noon Evening Bedtime GAS-X EXTRA STRENGTH 125 mg chewable tablet Generic drug:  simethicone Your last dose was: Your next dose is:    
   
   
 Dose:  125 mg Take 125 mg by mouth every six (6) hours as needed for Flatulence. Refills:  0  
     
   
   
   
  
 melatonin 5 mg Cap capsule Your last dose was: Your next dose is:    
   
   
 Dose:  5 mg Take 5 mg by mouth nightly. Refills:  0  
     
   
   
   
  
 multivitamin tablet Commonly known as:  ONE A DAY Your last dose was: Your next dose is:    
   
   
 Dose:  1 Tab Take 1 Tab by mouth daily. Refills:  0 Where to Get Your Medications Information on where to get these meds will be given to you by the nurse or doctor. ! Ask your nurse or doctor about these medications  
  oxyCODONE-acetaminophen 5-325 mg per tablet

## 2017-04-07 NOTE — ANESTHESIA POSTPROCEDURE EVALUATION
Post-Anesthesia Evaluation and Assessment    Patient: Annalise Rosario MRN: [id number]  SSN: [id number]    YOB: 1980  Age: 44 y.o. Sex: male       Cardiovascular Function/Vital Signs  Visit Vitals    /72 (BP 1 Location: Right arm, BP Patient Position: At rest)    Pulse 91    Temp 36.8 °C (98.2 °F)    Resp 10    Ht 6' (1.829 m)    Wt 82.2 kg (181 lb 3.5 oz)    SpO2 95%    BMI 24.58 kg/m2       Patient is status post general anesthesia for Procedure(s):  LAPAROSCOPIC CHOLECYSTECTOMY POSSIBLE OPEN CHOLECYSTECTOMY POSSIBLE CHOLANGIOGRAM.    Nausea/Vomiting: None    Postoperative hydration reviewed and adequate. Pain:  Pain Scale 1: Numeric (0 - 10) (04/07/17 1030)  Pain Intensity 1: 2 (04/07/17 1030)   Managed    Neurological Status:   Neuro (WDL): Exceptions to WDL (04/07/17 9147)  Neuro  Neurologic State: Appropriate for age;Drowsy; Eyes open spontaneously; Eyes open to voice (04/07/17 0947)  Orientation Level: Oriented to person;Oriented to place;Oriented to situation (04/07/17 0947)  Cognition: Follows commands (04/07/17 0947)  Speech: Clear (04/07/17 0641)  LUE Motor Response: Purposeful (04/07/17 0947)  LLE Motor Response: Purposeful (04/07/17 0947)  RUE Motor Response: Purposeful (04/07/17 0947)  RLE Motor Response: Purposeful (04/07/17 0947)   At baseline    Mental Status and Level of Consciousness: Arousable    Pulmonary Status:   O2 Device: Room air (04/07/17 1030)   Adequate oxygenation and airway patent    Complications related to anesthesia: None    Post-anesthesia assessment completed.  No concerns    Signed By: Renate Amezquita MD     April 7, 2017

## 2017-04-07 NOTE — H&P
Surgery History and Physical    Subjective:      Karine Arita is a 44 y.o. male  referred by Dr. Latasha Lin regarding gallstones. The patient reports that he had a history over several months of pain in the epigastrium which became at times severe after eating fatty food. Sometimes it would radiate to the back. It was associated with a feeling of intense bloating as though vomiting would help. He did not have any actual vomiting. He has discovered over the last month that if he avoids fatty food he does not have active symptoms. He has tried acid blocking medications to try to control his symptoms. He has no other GI history. He has no history of abdominal surgery.      The patient does actively smoke. The patient occasionally uses alcohol. The patient denies a history of heart disease or coronary intervention. He has no history of shortness of breath. There is no history of diabetes. There is no history of thyroid disease.         The patient had an ultrasound which showed contracted gallbladder with multiple stones. The patient had a HIDA scan which showed nonvisualization of the gallbladder.        History reviewed. No pertinent past medical history. History reviewed. No pertinent surgical history. Family History   Problem Relation Age of Onset    Depression Mother     Heart Disease Father     No Known Problems Sister     No Known Problems Brother      Social History   Substance Use Topics    Smoking status: Current Every Day Smoker     Packs/day: 1.00     Years: 20.00    Smokeless tobacco: Never Used    Alcohol use Yes      Comment: occasionally      Prior to Admission medications    Medication Sig Start Date End Date Taking? Authorizing Provider   simethicone (GAS-X EXTRA STRENGTH) 125 mg chewable tablet Take 125 mg by mouth every six (6) hours as needed for Flatulence. Yes Historical Provider   multivitamin (ONE A DAY) tablet Take 1 Tab by mouth daily.    Yes Historical Provider   melatonin 5 mg cap capsule Take 5 mg by mouth nightly. Yes Historical Provider      No Known Allergies    Review of Systems:  Pertinent items are noted in the History of Present Illness. Objective:      Patient Vitals for the past 8 hrs:   BP Temp Pulse Resp SpO2 Height Weight   17 0611 144/87 97.6 °F (36.4 °C) 96 18 100 % 6' (1.829 m) 82.2 kg (181 lb 3.5 oz)       Temp (24hrs), Av.6 °F (36.4 °C), Min:97.6 °F (36.4 °C), Max:97.6 °F (36.4 °C)      Physical Examination:   GENERAL: Alert man in no acute distress. VITAL SIGNS: BP: 120/82. P: 83. O2 saturation 99% on room air. WT: 191 lb. HEAD/NECK: No jaundice, mass or thyromegaly. LUNGS: Clear bilaterally without wheeze, rhonchi or rales. Shelvy Setting HEART: Regular without murmur, gallop or rub. ABDOMEN: Soft, nontender with no mass being palpated. No hernias are noted. NEURO: Patient is alert and oriented x 3 and moves all extremities equally.  Facial movement is symmetrical. Speech was normal.    Assessment:     Gallstones    Plan:     Laparoscopic cholecystectomy, possible open cholecystectomy, possible cholangiogram    Signed By: Bj Myles MD     2017

## 2017-04-07 NOTE — BRIEF OP NOTE
BRIEF OPERATIVE NOTE    Date of Procedure: 4/7/2017   Preoperative Diagnosis: GALLSTONES  Postoperative Diagnosis: GALLSTONES    Procedure(s):  LAPAROSCOPIC CHOLECYSTECTOMY   Surgeon(s) and Role:     * Alexandra Garcia MD - Primary            Surgical Staff:  Circ-1: Mica Avalos RN  Circ-Relief: Gaston Moeller RN  Radiology Technician: RT Lupillo  Scrub Tech-1: Sherrel Skilya  Surg Asst-1: [facility]  Flo Staff: Yuri Cannon RN  Event Time In   Incision Start 1015   Incision Close      Anesthesia: General   Estimated Blood Loss: 150 ml  Specimens:   ID Type Source Tests Collected by Time Destination   1 : GALLBLADDER Preservative Gallbladder  Alexandra Garcia MD 4/7/2017 8387 Pathology      Findings: Inflamed gallbladder, adherent to liver bed. Cystic duct obstructed, not able to pass cholangiogram catheter.    Complications: none  Implants: * No implants in log *   Drain - DAREN

## 2017-04-07 NOTE — PROGRESS NOTES
End of Shift Nursing Note    Bedside shift change report given to Hollie (oncoming nurse) by Kavitha Fernandez (offgoing nurse). Report included the following information SBAR, MAR and Recent Results. Significant changes during shift:    Ambulating in hallway, voiding, no nausea, pain well managed with Percocet q4. Non-emergent issues for physician to address:   none       Vital Signs:    Temp: 97.6 °F (36.4 °C)     Pulse (Heart Rate): (!) 107     BP: 118/73     Resp Rate: 18     O2 Sat (%): 98 %      GI:    Current diet:  DIET ONE TIME MESSAGE    Nausea: NO  Vomiting: NO  Bowel Sounds: YES  Flatus: NO      Patient Safety:    Falls Score: 1  Mobility Score: 1  Bed Alarm On? No  Sitter? No      Opportunity for questions and clarification was given to oncoming nurse. Patient bed is in low position, side rails are up x 2, door & observation blinds open as needed, call bell within reach and patient not in distress.     Viki Lerner

## 2017-04-07 NOTE — PERIOP NOTES
Handoff Report from Operating Room to PACU    Report received from SHARON Comer RN and VERNON Villafana CRNA regarding Annalise Rosario. Surgeon(s):  Geri Francis MD  And Procedure(s) (LRB):  LAPAROSCOPIC CHOLECYSTECTOMY POSSIBLE OPEN CHOLECYSTECTOMY POSSIBLE CHOLANGIOGRAM (N/A)  confirmed   with allergies, drains and dressings discussed. Anesthesia type, drugs, patient history, complications, estimated blood loss, vital signs, intake and output, and last pain medication, lines, reversal medications and temperature were reviewed.

## 2017-04-07 NOTE — PROGRESS NOTES
Primary Nurse Gilson Avalos and Elayne Richter, RN performed a dual skin assessment on this patient No impairment noted (surgical incisions only).   Rell score is 22

## 2017-04-08 VITALS
WEIGHT: 181.22 LBS | SYSTOLIC BLOOD PRESSURE: 124 MMHG | TEMPERATURE: 98 F | BODY MASS INDEX: 24.55 KG/M2 | HEIGHT: 72 IN | OXYGEN SATURATION: 98 % | HEART RATE: 58 BPM | DIASTOLIC BLOOD PRESSURE: 90 MMHG | RESPIRATION RATE: 17 BRPM

## 2017-04-08 LAB
ALBUMIN SERPL BCP-MCNC: 3.6 G/DL (ref 3.5–5)
ALBUMIN/GLOB SERPL: 0.9 {RATIO} (ref 1.1–2.2)
ALP SERPL-CCNC: 124 U/L (ref 45–117)
ALT SERPL-CCNC: 223 U/L (ref 12–78)
ANION GAP BLD CALC-SCNC: 9 MMOL/L (ref 5–15)
AST SERPL W P-5'-P-CCNC: 148 U/L (ref 15–37)
BASOPHILS # BLD AUTO: 0 K/UL (ref 0–0.1)
BASOPHILS # BLD: 0 % (ref 0–1)
BILIRUB SERPL-MCNC: 0.8 MG/DL (ref 0.2–1)
BUN SERPL-MCNC: 9 MG/DL (ref 6–20)
BUN/CREAT SERPL: 10 (ref 12–20)
CALCIUM SERPL-MCNC: 9 MG/DL (ref 8.5–10.1)
CHLORIDE SERPL-SCNC: 104 MMOL/L (ref 97–108)
CO2 SERPL-SCNC: 28 MMOL/L (ref 21–32)
CREAT SERPL-MCNC: 0.93 MG/DL (ref 0.7–1.3)
EOSINOPHIL # BLD: 0 K/UL (ref 0–0.4)
EOSINOPHIL NFR BLD: 0 % (ref 0–7)
ERYTHROCYTE [DISTWIDTH] IN BLOOD BY AUTOMATED COUNT: 12.9 % (ref 11.5–14.5)
GLOBULIN SER CALC-MCNC: 3.9 G/DL (ref 2–4)
GLUCOSE SERPL-MCNC: 95 MG/DL (ref 65–100)
HCT VFR BLD AUTO: 45.5 % (ref 36.6–50.3)
HGB BLD-MCNC: 15.8 G/DL (ref 12.1–17)
LYMPHOCYTES # BLD AUTO: 17 % (ref 12–49)
LYMPHOCYTES # BLD: 2.6 K/UL (ref 0.8–3.5)
MCH RBC QN AUTO: 31.6 PG (ref 26–34)
MCHC RBC AUTO-ENTMCNC: 34.7 G/DL (ref 30–36.5)
MCV RBC AUTO: 91 FL (ref 80–99)
MONOCYTES # BLD: 1.4 K/UL (ref 0–1)
MONOCYTES NFR BLD AUTO: 9 % (ref 5–13)
NEUTS SEG # BLD: 11.3 K/UL (ref 1.8–8)
NEUTS SEG NFR BLD AUTO: 74 % (ref 32–75)
PLATELET # BLD AUTO: 339 K/UL (ref 150–400)
POTASSIUM SERPL-SCNC: 3.9 MMOL/L (ref 3.5–5.1)
PROT SERPL-MCNC: 7.5 G/DL (ref 6.4–8.2)
RBC # BLD AUTO: 5 M/UL (ref 4.1–5.7)
SODIUM SERPL-SCNC: 141 MMOL/L (ref 136–145)
WBC # BLD AUTO: 15.4 K/UL (ref 4.1–11.1)

## 2017-04-08 PROCEDURE — 74011250637 HC RX REV CODE- 250/637: Performed by: SURGERY

## 2017-04-08 PROCEDURE — 99218 HC RM OBSERVATION: CPT

## 2017-04-08 PROCEDURE — 80053 COMPREHEN METABOLIC PANEL: CPT | Performed by: SURGERY

## 2017-04-08 PROCEDURE — 85025 COMPLETE CBC W/AUTO DIFF WBC: CPT | Performed by: SURGERY

## 2017-04-08 PROCEDURE — 36415 COLL VENOUS BLD VENIPUNCTURE: CPT | Performed by: SURGERY

## 2017-04-08 PROCEDURE — 74011250636 HC RX REV CODE- 250/636: Performed by: SURGERY

## 2017-04-08 RX ADMIN — SODIUM CHLORIDE 50 ML/HR: 900 INJECTION, SOLUTION INTRAVENOUS at 01:58

## 2017-04-08 RX ADMIN — Medication 10 ML: at 06:15

## 2017-04-08 RX ADMIN — HEPARIN SODIUM 5000 UNITS: 5000 INJECTION, SOLUTION INTRAVENOUS; SUBCUTANEOUS at 06:31

## 2017-04-08 RX ADMIN — HYDROMORPHONE HYDROCHLORIDE 0.5 MG: 1 INJECTION, SOLUTION INTRAMUSCULAR; INTRAVENOUS; SUBCUTANEOUS at 01:58

## 2017-04-08 RX ADMIN — OXYCODONE HYDROCHLORIDE AND ACETAMINOPHEN 2 TABLET: 5; 325 TABLET ORAL at 10:53

## 2017-04-08 RX ADMIN — OXYCODONE HYDROCHLORIDE AND ACETAMINOPHEN 2 TABLET: 5; 325 TABLET ORAL at 06:31

## 2017-04-08 NOTE — PROGRESS NOTES
End of Shift Nursing Note    Bedside shift change report given to Sosa Navarro RN (oncoming nurse) by Regulo Campos RN (offgoing nurse). Report included the following information SBAR, Kardex and MAR. Zone Phone:   [phone]    Significant changes during shift:    None   Non-emergent issues for physician to address:   None     Number times ambulated in hallway past shift: 2      Number of times OOB to chair past shift: 0    POD #:      Vital Signs:    Temp: 98.1 °F (36.7 °C)     Pulse (Heart Rate): 67     BP: 122/74     Resp Rate: 18     O2 Sat (%): 97 %    Lines & Drains:     Urinary Catheter? No   Placement Date:    Medical Necessity:   Central Line? No   Placement Date:    Medical Necessity:   PICC Line? No   Placement Date:    Medical Necessity:     NG tube [] in [] removed [x] not applicable   Drains [x] in [] removed [] not applicable     Skin Integrity:      Wounds: no   Dressings Present: no    Wound Concerns: no      GI:    Current diet:  DIET ONE TIME MESSAGE  DIET GI LITE (POST SURGICAL)    Nausea: NO  Vomiting: NO  Bowel Sounds: YES  Flatus: YES  Last Bowel Movement: yesterday   Appearance:     Respiratory:  Supplemental O2: No      Device:    via  Liters/min     Incentive Spirometer: YES  Volume:   Coughing and Deep Breathing: YES  Oral Care: YES  Understanding (patient/family education): YES   Getting out of bed: YES  Head of bed elevation: YES    Patient Safety:    Falls Score: 1  Mobility Score: 1  Bed Alarm On? No  Sitter? No      Opportunity for questions and clarification was given to oncoming nurse. Patient bed is in low position, side rails are up x 2, door & observation blinds open as needed, call bell within reach and patient not in distress.     Kita Dye

## 2017-04-08 NOTE — DISCHARGE SUMMARY
Physician Discharge Summary     Patient ID:  Luis Batista  [id number]  63 y.o.  1980    Admit Date: 4/7/2017    Discharge Date: 4/8/2017    Admission Diagnoses: GALLSTONES;GALLSTONES    Discharge Diagnoses: Active Problems:    * No active hospital problems. *       Admission Condition: Fair    Discharge Condition: Good    Last Procedure: Procedure(s):  LAPAROSCOPIC CHOLECYSTECTOMY POSSIBLE OPEN CHOLECYSTECTOMY POSSIBLE CHOLANGIOGRAM      Hospital Course:   Normal hospital course for this procedure. Consults: None    Disposition: home    Patient Instructions:   Current Discharge Medication List      START taking these medications    Details   oxyCODONE-acetaminophen (PERCOCET) 5-325 mg per tablet Take 1-2 Tabs by mouth every four (4) hours as needed. Max Daily Amount: 12 Tabs. Qty: 30 Tab, Refills: 0         CONTINUE these medications which have NOT CHANGED    Details   simethicone (GAS-X EXTRA STRENGTH) 125 mg chewable tablet Take 125 mg by mouth every six (6) hours as needed for Flatulence. multivitamin (ONE A DAY) tablet Take 1 Tab by mouth daily. melatonin 5 mg cap capsule Take 5 mg by mouth nightly. Activity: No driving while on analgesics and No heavy lifting for 4 weeks  Diet: Low fat, Low cholesterol  Wound Care: Keep wound clean and dry    Follow-up with Dr. Bhakti Topete in 2 weeks.   Follow-up tests/labs none    Signed:  Shon Rincon MD  [facility]  4/8/2017  10:02 AM

## 2017-04-08 NOTE — PROGRESS NOTES
I have reviewed discharge instructions with the patient. The patient verbalized understanding. DAREN drain removed, PIV's removed, given prescription. Taken to door in wheelchair.

## 2017-04-19 ENCOUNTER — PATIENT MESSAGE (OUTPATIENT)
Dept: SURGERY | Age: 37
End: 2017-04-19

## 2017-04-19 DIAGNOSIS — Z90.49 S/P LAPAROSCOPIC CHOLECYSTECTOMY: ICD-10-CM

## 2017-04-19 DIAGNOSIS — R79.89 ABNORMAL LFTS: Primary | ICD-10-CM

## 2017-04-20 ENCOUNTER — DOCUMENTATION ONLY (OUTPATIENT)
Dept: SURGERY | Age: 37
End: 2017-04-20

## 2017-04-20 NOTE — PROGRESS NOTES
Surgery    Pathology report on gallbladder showed: Addendum Diagnosis   Gallbladder, cholecystectomy:   Cholelithiasis with acute and chronic cholecystitis including focal mucosal necrosis   Reactive cholangiolar proliferation, see comment   Addendum Comment   Because of the atypical glandular proliferation between the gallbladder wall and liver, this case was referred to the [facility], Department of Pathology for expert consultation [facility] [id number]) with the above representing the interpretation of Dr. Otto Pereyra. No further intervention indicated. The patient had mild elevation of LFT's following cholecystectomy. My staff will contact patient for follow up lab.     Ra Batista MD

## 2017-04-21 NOTE — TELEPHONE ENCOUNTER
Spoke with Mr Cristhian Sun. He would like to move post op appt to Thursday, 5/4/17 in order to have labs done during week of 4/24/17.   Will fax order to [facility].

## 2017-04-28 LAB
ALBUMIN SERPL-MCNC: 4.3 G/DL (ref 3.5–5.5)
ALP SERPL-CCNC: 92 IU/L (ref 39–117)
ALT SERPL-CCNC: 65 IU/L (ref 0–44)
AST SERPL-CCNC: 26 IU/L (ref 0–40)
BILIRUB DIRECT SERPL-MCNC: 0.14 MG/DL (ref 0–0.4)
BILIRUB SERPL-MCNC: 0.4 MG/DL (ref 0–1.2)
PROT SERPL-MCNC: 7 G/DL (ref 6–8.5)

## 2017-05-04 ENCOUNTER — OFFICE VISIT (OUTPATIENT)
Dept: SURGERY | Age: 37
End: 2017-05-04

## 2017-05-04 VITALS
HEART RATE: 87 BPM | WEIGHT: 189 LBS | SYSTOLIC BLOOD PRESSURE: 126 MMHG | OXYGEN SATURATION: 98 % | DIASTOLIC BLOOD PRESSURE: 81 MMHG | HEIGHT: 72 IN | BODY MASS INDEX: 25.6 KG/M2

## 2017-05-04 DIAGNOSIS — Z09 POSTOPERATIVE EXAMINATION: Primary | ICD-10-CM

## 2017-05-04 PROBLEM — Z90.49 HX LAPAROSCOPIC CHOLECYSTECTOMY: Status: ACTIVE | Noted: 2017-05-04

## 2017-05-04 PROBLEM — K80.20 GALLSTONES: Status: RESOLVED | Noted: 2017-02-08 | Resolved: 2017-05-04

## 2017-05-04 NOTE — MR AVS SNAPSHOT
Visit Information Date & Time Provider Department Dept. Phone Encounter #  
 5/4/2017  4:00 PM Bj Myles MD [facility] [address] [phone] [id number] Upcoming Health Maintenance Date Due Pneumococcal 19-64 Medium Risk (1 of 1 - PPSV23) 4/4/1998 DTaP/Tdap/Td series (1 - Tdap) 4/4/2000 INFLUENZA AGE 9 TO ADULT 8/1/2017 Allergies as of 5/4/2017  Review Complete On: 5/4/2017 By: Bj Myles MD  
 No Known Allergies Current Immunizations  Never Reviewed No immunizations on file. Not reviewed this visit You Were Diagnosed With   
  
 Codes Comments Postoperative examination    -  Primary ICD-10-CM: F80 ICD-9-CM: V67.00 Vitals BP Pulse Height(growth percentile) Weight(growth percentile) SpO2 BMI  
 126/81 (BP 1 Location: Right arm, BP Patient Position: Sitting) 87 6' (1.829 m) 189 lb (85.7 kg) 98% 25.63 kg/m2 Smoking Status Current Every Day Smoker BMI and BSA Data Body Mass Index Body Surface Area  
 25.63 kg/m 2 2.09 m 2 Preferred Pharmacy Pharmacy Name Phone [facility] [id number] [facility] [address], [address] [place] [address] [phone] Your Updated Medication List  
  
   
This list is accurate as of: 5/4/17  4:54 PM.  Always use your most recent med list.  
  
  
  
  
 GAS-X EXTRA STRENGTH 125 mg chewable tablet Generic drug:  simethicone Take 125 mg by mouth every six (6) hours as needed for Flatulence. melatonin 5 mg Cap capsule Take 5 mg by mouth nightly. multivitamin tablet Commonly known as:  ONE A DAY Take 1 Tab by mouth daily. oxyCODONE-acetaminophen 5-325 mg per tablet Commonly known as:  PERCOCET Take 1-2 Tabs by mouth every four (4) hours as needed. Max Daily Amount: 12 Tabs. Introducing [facility]! Dear Eleuterio Guess: Thank you for requesting a [employer] account.   Our records indicate that you already have an active [facility] account. You can access your account anytime at [email or web address] Did you know that you can access your hospital and ER discharge instructions at any time in [facility] [facility] [employer]? You can also review all of your test results from your hospital stay or ER visit. Additional Information If you have questions, please visit the Frequently Asked Questions section of the [employer] website at [email or web address] Remember, [facility] [facility] is NOT to be used for urgent needs. For medical emergencies, dial 911. Now available from your iPhone and Android! Please provide this summary of care documentation to your next provider. Your primary care clinician is listed as Phys Other. If you have any questions after today's visit, please call [phone].

## 2017-05-04 NOTE — PROGRESS NOTES
The patient is status post  laparoscopic cholecystectomy on 4/7/17. He had severe inflammation of the gallbladder. Cholangiogram could not be obtained because of obstruction of the cystic duct. Post operative bilirubin was normal but other LFTs were elevated. The patient on post operative testing on 4/24/17 had marked improvement in liver function test measurements. The patient reports he is eating well. His strength and energy is coming back. He is getting about well. On examination, patient's abdomen is soft. All incision sites are healing nicely. I reviewed with the patient the pathology report. This showed acute and chronic cholecystitis including focal mucosal necrosis. There were areas of atypical glandular proliferation at the border with the liver and these tissue slides were sent to [facility] with confirmation of benign reactive cholangiolar proliferation. I will not plan any follow up given the benign pathology. The patient can follow up prn. Final Diagnosis:  Status post  laparoscopic cholecystectomy, post op visit.     MedDATA/gwo

## (undated) DEVICE — HEMADUCT 7MM FLAT, FULL DUCT: Brand: CARDINAL HEALTH

## (undated) DEVICE — Device

## (undated) DEVICE — KENDALL SCD EXPRESS SLEEVES, KNEE LENGTH, MEDIUM: Brand: KENDALL SCD

## (undated) DEVICE — STERILE POLYISOPRENE POWDER-FREE SURGICAL GLOVES: Brand: PROTEXIS

## (undated) DEVICE — INFECTION CONTROL KIT SYS

## (undated) DEVICE — BLADELESS OPTICAL TROCAR WITH FIXATION CANNULA: Brand: VERSAPORT

## (undated) DEVICE — NEEDLE HYPO 25GA L1.5IN BVL ORIENTED ECLIPSE

## (undated) DEVICE — ELECTRODE ES SHFT L29CM HK OD5MM ENDOPATH PRB + II

## (undated) DEVICE — DEVON™ KNEE AND BODY STRAP 60" X 3" (1.5 M X 7.6 CM): Brand: DEVON

## (undated) DEVICE — TUBING IRRIG PRESSURIZED BG SET SPIK PRB + II

## (undated) DEVICE — (D)PREP SKN CHLRAPRP APPL 26ML -- CONVERT TO ITEM 371833

## (undated) DEVICE — DERMABOND SKIN ADH 0.7ML -- DERMABOND ADVANCED 12/BX

## (undated) DEVICE — SUT ETHLN 3-0 18IN PS2 BLK --

## (undated) DEVICE — BLADELESS OPTICAL TROCAR WITH FIXATION CANNULA: Brand: VERSAONE

## (undated) DEVICE — HANDLE LT SNAP ON ULT DURABLE LENS FOR TRUMPF ALC DISPOSABLE

## (undated) DEVICE — CLIP APPLIER WITH CLIP LOGIC TECHNOLOGY: Brand: ENDO CLIP III

## (undated) DEVICE — SUTURE VCRL SZ 4-0 L27IN ABSRB UD L19MM PS-2 3/8 CIR PRIM J426H

## (undated) DEVICE — GAUZE SPONGES,12 PLY: Brand: CURITY

## (undated) DEVICE — HANDLE PRB DIA5MM HND CTRL PSTL GRP ENDOPATH PRB + II

## (undated) DEVICE — SUTURE SZ 0 27IN 5/8 CIR UR-6  TAPER PT VIOLET ABSRB VICRYL J603H

## (undated) DEVICE — SPECIMEN RETRIEVAL POUCH: Brand: ENDO CATCH GOLD

## (undated) DEVICE — 1200 GUARD II KIT W/5MM TUBE W/O VAC TUBE: Brand: GUARDIAN

## (undated) DEVICE — REM POLYHESIVE ADULT PATIENT RETURN ELECTRODE: Brand: VALLEYLAB

## (undated) DEVICE — SOLUTION IV 1000ML 0.9% SOD CHL

## (undated) DEVICE — UNIVERSAL FIXATION CANNULA: Brand: VERSAONE

## (undated) DEVICE — SURGICAL PROCEDURE KIT GEN LAPAROSCOPY LF